# Patient Record
Sex: FEMALE | Race: WHITE
[De-identification: names, ages, dates, MRNs, and addresses within clinical notes are randomized per-mention and may not be internally consistent; named-entity substitution may affect disease eponyms.]

---

## 2020-02-28 ENCOUNTER — HOSPITAL ENCOUNTER (EMERGENCY)
Dept: HOSPITAL 26 - MED | Age: 25
LOS: 1 days | Discharge: HOME | End: 2020-02-29
Payer: MEDICAID

## 2020-02-28 VITALS — BODY MASS INDEX: 23.77 KG/M2 | HEIGHT: 63 IN | WEIGHT: 134.13 LBS

## 2020-02-28 VITALS — SYSTOLIC BLOOD PRESSURE: 125 MMHG | DIASTOLIC BLOOD PRESSURE: 73 MMHG

## 2020-02-28 DIAGNOSIS — R51: Primary | ICD-10-CM

## 2020-02-28 DIAGNOSIS — K08.89: ICD-10-CM

## 2020-02-28 NOTE — NUR
C/O SHARP TOOTHPAIN X MONTHS (RIGHT LOWER SECOND MOLAR). PT REPORTS PAIN HAS 
BECOME UNBEARABLE. TX WITH TYLENOL, MOTRIN, AND ICE WITH NO RELIEF. PAIN IS A 
9/10 SHARP PAIN THAT RADIATES TO THE TEMPLE AND CAUSES HEADACHES. DENIES N/V/D. 
ERMD MADE AWARE OF STATUS. SIDE [id number]. WILL CONTINUE TO MONITOR. 



PMH:DENIES

RX:DENIES

NKDA

## 2020-02-29 VITALS — DIASTOLIC BLOOD PRESSURE: 71 MMHG | SYSTOLIC BLOOD PRESSURE: 121 MMHG

## 2020-02-29 NOTE — NUR
Patient discharged with v/s stable. Written and verbal after care instructions 
given and explained. 

Patient alert, oriented and verbalized understanding of instructions. 
Ambulatory with steady gait. All questions addressed prior to discharge. ID 
band removed. Patient advised to follow up with PMD. Rx of NORCO given. Patient 
educated on indication of medication including possible reaction and side 
effects. Opportunity to ask questions provided and answered.

## 2022-01-16 ENCOUNTER — HOSPITAL ENCOUNTER (EMERGENCY)
Dept: HOSPITAL 26 - MED | Age: 27
Discharge: HOME | End: 2022-01-16
Payer: MEDICAID

## 2022-01-16 VITALS — SYSTOLIC BLOOD PRESSURE: 124 MMHG | DIASTOLIC BLOOD PRESSURE: 82 MMHG

## 2022-01-16 VITALS — HEIGHT: 64 IN | WEIGHT: 146 LBS | BODY MASS INDEX: 24.92 KG/M2

## 2022-01-16 VITALS — DIASTOLIC BLOOD PRESSURE: 82 MMHG | SYSTOLIC BLOOD PRESSURE: 124 MMHG

## 2022-01-16 DIAGNOSIS — N39.0: Primary | ICD-10-CM

## 2022-01-16 DIAGNOSIS — Z79.899: ICD-10-CM

## 2022-01-16 NOTE — NUR
Patient discharged with v/s stable. Written and verbal after care instructions 
ABOUT URINARY TRACT INFECTION given and explained. 

Patient alert, oriented and verbalized understanding of instructions. 
Ambulatory with steady gait. All questions addressed prior to discharge. ID 
band removed. Patient advised to follow up with PMD. Rx of KEFLEX AND PYRIDIUM 
given. Patient educated on indication of medication including possible reaction 
and side effects. Opportunity to ask questions provided and answered.